# Patient Record
Sex: MALE | Race: WHITE | NOT HISPANIC OR LATINO | Employment: FULL TIME | URBAN - METROPOLITAN AREA
[De-identification: names, ages, dates, MRNs, and addresses within clinical notes are randomized per-mention and may not be internally consistent; named-entity substitution may affect disease eponyms.]

---

## 2017-02-09 RX ORDER — BUPROPION HYDROCHLORIDE 150 MG/1
150 TABLET, FILM COATED, EXTENDED RELEASE ORAL 2 TIMES DAILY
Qty: 180 TAB | Refills: 1 | Status: SHIPPED | OUTPATIENT
Start: 2017-02-09 | End: 2017-07-13 | Stop reason: SDUPTHER

## 2017-02-09 NOTE — TELEPHONE ENCOUNTER
Was the patient seen in the last year in this department? Yes     Does patient have an active prescription for medications requested? No     Received Request Via: Pharmacy       Last visit: 11/3/16    Last labs:11/8/16

## 2017-07-13 ENCOUNTER — OFFICE VISIT (OUTPATIENT)
Dept: MEDICAL GROUP | Facility: MEDICAL CENTER | Age: 27
End: 2017-07-13
Payer: COMMERCIAL

## 2017-07-13 VITALS
HEART RATE: 84 BPM | HEIGHT: 69 IN | TEMPERATURE: 100 F | DIASTOLIC BLOOD PRESSURE: 60 MMHG | BODY MASS INDEX: 23.25 KG/M2 | WEIGHT: 157 LBS | OXYGEN SATURATION: 95 % | SYSTOLIC BLOOD PRESSURE: 110 MMHG

## 2017-07-13 DIAGNOSIS — Z86.19 HISTORY OF CHICKEN POX: ICD-10-CM

## 2017-07-13 DIAGNOSIS — F32.A ANXIETY AND DEPRESSION: ICD-10-CM

## 2017-07-13 DIAGNOSIS — F41.9 ANXIETY AND DEPRESSION: ICD-10-CM

## 2017-07-13 PROCEDURE — 99214 OFFICE O/P EST MOD 30 MIN: CPT | Performed by: FAMILY MEDICINE

## 2017-07-13 RX ORDER — BUPROPION HYDROCHLORIDE 150 MG/1
150 TABLET, FILM COATED, EXTENDED RELEASE ORAL 2 TIMES DAILY
Qty: 180 TAB | Refills: 1 | Status: SHIPPED | OUTPATIENT
Start: 2017-07-13 | End: 2017-08-14 | Stop reason: SDUPTHER

## 2017-07-13 NOTE — PATIENT INSTRUCTIONS
Please find then bring in records of the following vaccinations to next appointment:     1) Hepatitis B vaccine (should be at least 2, if not 3 vaccines as a child)     2) Hepatitis A vaccine (should have been 2)     3) HPV (Human Papilloma Virus, should have been 3)

## 2017-07-13 NOTE — MR AVS SNAPSHOT
"        Zaid Jones   2017 3:40 PM   Office Visit   MRN: [id number]    Department:  [place] [address]   Dept Phone:  [phone]    Description:  Male : 1990   Provider:  Pankaj Camarillo M.D.           Reason for Visit     Medication Refill Zyban >>> smoking cessation      Allergies as of 2017     No Known Allergies      You were diagnosed with     Anxiety and depression   [003660]       History of chicken pox   [283325]         Vital Signs     Blood Pressure Pulse Temperature Height Weight Body Mass Index    110/60 mmHg 84 37.8 °C (100 °F) 1.753 m (5' 9.02\") 71.215 kg (157 lb) 23.17 kg/m2    Oxygen Saturation Smoking Status                95% Former Smoker          Basic Information     Date Of Birth Sex Race Ethnicity Preferred Language    1990 Male White Non- English      Your appointments     Aug 14, 2017  1:20 PM   Established Patient with Pankaj Camarillo M.D.   [facility]    [address]  [address]  [facility] [id number] [phone]           You will be receiving a confirmation call a few days before your appointment from our automated call confirmation system.              Problem List              ICD-10-CM Priority Class Noted - Resolved    Anxiety and depression F41.9, F32.9   11/3/2016 - Present    Chewing tobacco nicotine dependence F17.220   11/3/2016 - Present    S/P shoulder surgery Z98.890   12/3/2016 - Present    History of chicken pox Z86.19   2017 - Present      Health Maintenance        Date Due Completion Dates    IMM HEP B VACCINE (1 of 3 - Primary Series) 1990 ---    IMM HEP A VACCINE (1 of 2 - Standard Series) 10/8/1993 ---    IMM HPV VACCINE (1 of 3 - Male 3 Dose Series) 10/8/2003 ---    IMM VARICELLA (CHICKENPOX) VACCINE (1 of 2 - 2 Dose Adolescent Series) 10/8/2005 ---    IMM INFLUENZA (1) 2017 ---    IMM DTaP/Tdap/Td Vaccine (2 - Td) 2022            Current " Immunizations     Meningococcal Conjugate Vaccine MCV4 (Menactra) 12/14/2012    Tdap Vaccine 12/14/2012      Below and/or attached are the medications your provider expects you to take. Review all of your home medications and newly ordered medications with your provider and/or pharmacist. Follow medication instructions as directed by your provider and/or pharmacist. Please keep your medication list with you and share with your provider. Update the information when medications are discontinued, doses are changed, or new medications (including over-the-counter products) are added; and carry medication information at all times in the event of emergency situations     Allergies:  No Known Allergies          Medications  Valid as of: July 13, 2017 -  3:55 PM    Generic Name Brand Name Tablet Size Instructions for use    BuPROPion HCl (Smoking Deter) (TABLET SR 12 HR) ZYBAN 150 MG Take 1 Tab by mouth 2 times a day.        .                 Medicines prescribed today were sent to:     [facility] [address] [place] [address], [place] - [address]    [address] [place] [address]    Phone: [phone] Fax: [phone]    Open 24 Hours?: No      Medication refill instructions:       If your prescription bottle indicates you have medication refills left, it is not necessary to call your provider’s office. Please contact your pharmacy and they will refill your medication.    If your prescription bottle indicates you do not have any refills left, you may request refills at any time through one of the following ways: The online [employer] system (except Urgent Care), by calling your provider’s office, or by asking your pharmacy to contact your provider’s office with a refill request. Medication refills are processed only during regular business hours and may not be available until the next business day. Your provider may request additional information or to have a follow-up visit with you prior to refilling your medication.      *Please Note: Medication refills are assigned a new Rx number when refilled electronically. Your pharmacy may indicate that no refills were authorized even though a new prescription for the same medication is available at the pharmacy. Please request the medicine by name with the pharmacy before contacting your provider for a refill.        Instructions    Please find then bring in records of the following vaccinations to next appointment:     1) Hepatitis B vaccine (should be at least 2, if not 3 vaccines as a child)     2) Hepatitis A vaccine (should have been 2)     3) HPV (Human Papilloma Virus, should have been 3)          MyChart Access Code: Activation code not generated  Current MyChart Status: Active          Quit Tobacco Information     Do you want to quit using tobacco?    Quitting tobacco decreases risks of cancer, heart and lung disease, increases life expectancy, improves sense of taste and smell, and increases spending money, among other benefits.    If you are thinking about quitting, we can help.  • [facility] Quit Tobacco Program: [phone]  o Program occurs weekly for four weeks and includes pharmacist consultation on products to support quitting smoking or chewing tobacco. A provider referral is needed for pharmacist consultation.  • Tobacco Users Help Hotline: 0-599-QUIT-NOW (440-1891) or https://[place].quitlogix.org/  o Free, confidential telephone and online coaching for [place] residents. Sessions are designed on a schedule that is convenient for you. Eligible clients receive free nicotine replacement therapy.  • Nationally: www.smokefree.gov  o Information and professional assistance to support both immediate and long-term needs as you become, and remain, a non-smoker. Smokefree.gov allows you to choose the help that best fits your needs.

## 2017-07-19 NOTE — PROGRESS NOTES
Subjective:     Chief Complaint   Patient presents with   • Medication Refill     Zyban >>> smoking cessation       History of Present Illness:  Zaid Jones is a 26 y.o. male established patient who presents today to discuss Zyban refill for depression/anxiety management as well as to decrease dependence on chewing tobacco:    Anxiety and depression  Patient with continued stress/anxiety, states that he has continued using chew tobacco, and had been using Zyban until he ran out.  Previous dosage seemed to be working well.  Patient would like to stop chewing tobacco, realizes that he is using it to control stress/anxiety.  Patient states frequency of panic attacks was decreased on Zyban.    Patient continues to decline referral to psychiatrist and psychologist.    ROS is NEGATIVE for dizziness, generalized weakness/fatigue, vision/hearing changes, jaw pain/paresthesias, BUE pain/paresthesias/numbness/weakness, chest pain/pressure, palpitations, dyspnea, RUQ abdominal pain, oliguria/anuria, BLE edema.    History of chicken pox  Listed for historical purposes only.  Patient states he believes he had this at 9yo.  Patient declines Varicella vaccine at present.      Patient Active Problem List    Diagnosis Date Noted   • History of chicken pox 07/13/2017   • S/P shoulder surgery 12/03/2016   • Anxiety and depression 11/03/2016   • Chewing tobacco nicotine dependence 11/03/2016       Additional History:   Allergies:    Review of patient's allergies indicates no known allergies.     Current Medications:     Current Outpatient Prescriptions   Medication Sig Dispense Refill   • buPROPion SR (ZYBAN) 150 MG SR tablet Take 1 Tab by mouth 2 times a day. 180 Tab 1     No current facility-administered medications for this visit.        Social History:     Social History   Substance Use Topics   • Smoking status: Former Smoker     Types: Cigarettes     Quit date: 07/01/2015   • Smokeless tobacco: Current User     Types:  "Chew      Comment: 1chewing tin per day   • Alcohol Use: 0.6 oz/week     1 Cans of beer per week       ROS:     - NOTE: All other systems reviewed and are negative, except as in HPI.     Objective:   Physical Exam:    Vitals: Blood pressure 110/60, pulse 84, temperature 37.8 °C (100 °F), height 1.753 m (5' 9.02\"), weight 71.215 kg (157 lb), SpO2 95 %.   BMI: Body mass index is 23.17 kg/(m^2).   General/Constitutional: Vitals as above, Well nourished, well developed male in no acute distress   Head/Eyes: Head is grossly normal & atraumatic, bilateral conjunctivae clear and not injected, bilateral EOMI, bilateral PERRL   ENT: Bilateral external ears grossly normal in appearance, Hearing grossly intact, External nares normal in appearance and without discharge/bleeding   Respiratory: No respiratory distress, bilateral lungs are clear to ausculation in all lung fields (anterior/lateral/posterior), no wheezing/rhonchi/rales   Cardiovascular: Regular rate and rhythm without murmur/gallops/rubs, distal pulses are intact and equal bilaterally (radial, posterior tibial), no bilateral lower extremity edema   MSK: Gait grossly normal & not antalgic   Integumentary: No apparent rashes   Psych: Judgment grossly appropriate, mild apparent anxiety    Health Maintenance:      - declines varicella vaccine    Assessment and Plan:   1. Anxiety and depression  Uncontrolled.  Resume Zyban, to be started as 150mg PO Qday for 1 week, then to increase to BID.   - buPROPion SR (ZYBAN) 150 MG SR tablet; Take 1 Tab by mouth 2 times a day.  Dispense: 180 Tab; Refill: 1    2. History of chicken pox  Listed for histroical purposes, currently asymptomatic.      RTC: in 1 month to review anxiety/depression.    PLEASE NOTE: This dictation was created using voice recognition software. I have made every reasonable attempt to correct obvious errors, but I expect that there are errors of grammar and possibly content that I did not discover before " finalizing the note.

## 2017-07-19 NOTE — ASSESSMENT & PLAN NOTE
Listed for historical purposes only.  Patient states he believes he had this at 7yo.  Patient declines Varicella vaccine at present.

## 2017-07-19 NOTE — ASSESSMENT & PLAN NOTE
Patient with continued stress/anxiety, states that he has continued using chew tobacco, and had been using Zyban until he ran out.  Previous dosage seemed to be working well.  Patient would like to stop chewing tobacco, realizes that he is using it to control stress/anxiety.  Patient states frequency of panic attacks was decreased on Zyban.    Patient continues to decline referral to psychiatrist and psychologist.    ROS is NEGATIVE for dizziness, generalized weakness/fatigue, vision/hearing changes, jaw pain/paresthesias, BUE pain/paresthesias/numbness/weakness, chest pain/pressure, palpitations, dyspnea, RUQ abdominal pain, oliguria/anuria, BLE edema.

## 2017-08-14 ENCOUNTER — OFFICE VISIT (OUTPATIENT)
Dept: MEDICAL GROUP | Facility: MEDICAL CENTER | Age: 27
End: 2017-08-14
Payer: COMMERCIAL

## 2017-08-14 VITALS
BODY MASS INDEX: 23.22 KG/M2 | WEIGHT: 156.75 LBS | HEART RATE: 72 BPM | HEIGHT: 69 IN | RESPIRATION RATE: 16 BRPM | OXYGEN SATURATION: 98 % | SYSTOLIC BLOOD PRESSURE: 110 MMHG | TEMPERATURE: 98.2 F | DIASTOLIC BLOOD PRESSURE: 64 MMHG

## 2017-08-14 DIAGNOSIS — F41.9 ANXIETY AND DEPRESSION: ICD-10-CM

## 2017-08-14 DIAGNOSIS — F17.220 CHEWING TOBACCO NICOTINE DEPENDENCE WITHOUT COMPLICATION: ICD-10-CM

## 2017-08-14 DIAGNOSIS — B07.8 VERRUCA PALMARIS: ICD-10-CM

## 2017-08-14 DIAGNOSIS — F32.A ANXIETY AND DEPRESSION: ICD-10-CM

## 2017-08-14 PROCEDURE — 17110 DESTRUCTION B9 LES UP TO 14: CPT | Performed by: FAMILY MEDICINE

## 2017-08-14 PROCEDURE — 99214 OFFICE O/P EST MOD 30 MIN: CPT | Mod: 25 | Performed by: FAMILY MEDICINE

## 2017-08-14 PROCEDURE — 99406 BEHAV CHNG SMOKING 3-10 MIN: CPT | Performed by: FAMILY MEDICINE

## 2017-08-14 RX ORDER — BUPROPION HYDROCHLORIDE 150 MG/1
150 TABLET, FILM COATED, EXTENDED RELEASE ORAL 2 TIMES DAILY
Qty: 180 TAB | Refills: 1 | Status: SHIPPED | OUTPATIENT
Start: 2017-08-14

## 2017-08-14 NOTE — PROGRESS NOTES
Subjective:     Chief Complaint   Patient presents with   • Medication Refill       History of Present Illness:  aZid Jones is a 26 y.o. male established patient who presents today to discuss measuring anxiety/depression as well as chewing tobacco cessation, and also to have hand warts addressed.:    Anxiety and depression  Uncontrolled, however unchanged from visit in July 2017. Patient admits that he did not  his Zyban. Patient therefore has been relying on his chew tobacco for relief of anxiety. However, patient has made headway with this, chewing one to one half cans every 2 days rather than one can per day as previous.    Patient continues to decline referral to psychiatrist or psychologist.    ROS is NEGATIVE for generalized weakness/fatigue, dizziness, vision/hearing changes, chest pain/pressure, palpitations, dyspnea, tachypnea, intense feelings of dread, insomnia, decreased appetite, persistent nausea.    Chewing tobacco nicotine dependence  Please see notes from same date of service 8/14/2017 regarding anxiety and depression.    Verruca palmaris  Patient has multiple palmar warts and warts on fingers that he is concerned about. Patient would like to have these evaluated and treated. Patient is not clear on when these lesions started, states that they have been occuring since at least late high school, maybe longer, and come and go (somewhat) with home cryotherapy treatment. However, these home cryotherapy treatments do not fully resolve the lesions.      Patient Active Problem List    Diagnosis Date Noted   • Verruca palmaris 08/14/2017   • History of chicken pox 07/13/2017   • S/P shoulder surgery 12/03/2016   • Anxiety and depression 11/03/2016   • Chewing tobacco nicotine dependence 11/03/2016       Additional History:   Allergies:    Review of patient's allergies indicates no known allergies.     Current Medications:     Current Outpatient Prescriptions   Medication Sig Dispense Refill  "  • buPROPion SR (ZYBAN) 150 MG SR tablet Take 1 Tab by mouth 2 times a day. Start as one tablet by mouth daily for one week, then increase to twice daily if needed. 180 Tab 1     No current facility-administered medications for this visit.        Social History:     Social History   Substance Use Topics   • Smoking status: Former Smoker     Types: Cigarettes     Quit date: 07/01/2015   • Smokeless tobacco: Current User     Types: Chew      Comment: (08/14/17) 1-1.5chewing tin per 2days   • Alcohol Use: 0.6 oz/week     1 Cans of beer per week       ROS:     - NOTE: All other systems reviewed and are negative, except as in HPI.     Objective:   Physical Exam:    Vitals: Blood pressure 110/64, pulse 72, temperature 36.8 °C (98.2 °F), resp. rate 16, height 1.753 m (5' 9.02\"), weight 71.1 kg (156 lb 12 oz), SpO2 98 %.   BMI: Body mass index is 23.14 kg/(m^2).   General/Constitutional: Vitals as above, Well nourished, well developed male in no acute distress   Head/Eyes: Head is grossly normal & atraumatic, bilateral conjunctivae clear and not injected, bilateral EOMI, bilateral PERRL   ENT: Bilateral external ears grossly normal in appearance, Hearing grossly intact, External nares normal in appearance and without discharge/bleeding   Respiratory: No respiratory distress, bilateral lungs are clear to ausculation in all lung fields (anterior/lateral/posterior), no wheezing/rhonchi/rales   Cardiovascular: Regular rate and rhythm without murmur/gallops/rubs, distal pulses are intact and equal bilaterally (radial, posterior tibial), no bilateral lower extremity edema   MSK: Gait grossly normal & not antalgic   Integumentary: Right hand has one lesion each on the third and fourth digits on dorsal side (third digit has a small 2-3 mm diameter by 1-2 mm in height warty lesion that is overlying his proximal interphalangeal joint, fourth digit has a similar sized lesion on the middle phalanx), Left hand has 4 lesions in total (2 " lesions on the fourth digit intermediate phalanx that are approximately 1-2 mm in diameter nearly flat, lesion on the second digit distal phalanx lateral side on palmar aspect is approximately 5-6 mm in diameter with 2-3 mm in height with melanotic flecks, and lesion on palmar surface of hand approximately over the lunate bone is approximately 8-9 mm in diameter that is approximately 1 mm in height with melanotic flecks), No apparent rashes   Psych: Judgment grossly appropriate, no apparent depression/anxiety    Procedure: Cutaneous cryotherapy    **NOTE: Sterile technique was maintained throughout procedure       1) Risks, benefits and alternatives of procedure were discussed with patient including, but not limited to: poor cosmetic outcome (blistering, hypopigmentation, scarring [rare]) and bleeding, low risk of infection and minimal wound care, and watchful waiting, respectively.     2) Patient verbalizes consent to proceed with procedure.     3) Location identified with patient prior to procedure start as above.     4) Areas cleaned with alcohol swabs.      5) Multiple 2-3 second bursts of cryotherapy were applied to lesion, waiting 1-2 seconds between bursts until blanching occurred. This process was performed 4 times in total for left hand fourth digit lesions, 5 times in total for right hand dorsal lesions, and 6 times in total for the remaining lesions on left hand (second digit left hand, lesion overlying the lunate bone on palmar surface) times in total.     6) Bandaids applied to cover lesions on left hand as she is a  and uses a baseball glove/mitt on his hand.     7) Patient tolerated procedure well.  Patient verbalized understanding that pain, as well as blistering or scabbing reaction would likely occur. Patient reminded not pick at the area and to expect possible hypopigmented scars from the procedure. Return if lesion fails to fully resolve.    Health Maintenance:      - not addressed  at this visit    Assessment and Plan:   1. Anxiety and depression  Uncontrolled. Trial of Zyban as below. Reassess symptoms in approximately 4 weeks.   - buPROPion SR (ZYBAN) 150 MG SR tablet; Take 1 Tab by mouth 2 times a day. Start as one tablet by mouth daily for one week, then increase to twice daily if needed.  Dispense: 180 Tab; Refill: 1    2. Chewing tobacco nicotine dependence without complication  Uncontrolled.   A) Discussion: Patient and I discussed various methods for chewing tobacco cessation including pharmacological supports (Chantix vs. Zyban, Nicotine replacement therapy), smoking cessation groups, smoking hotlines, as well as abrupt cessation over gradual cessation (in addition to setting quit dates; ref: [employer]. doi:10.9826/E13-6396).  We discussed that number of lifetime quit attempts vary (http://dx.doi.org/10.1136/yslnrzl-7490-761214) but that probability of success increases when combining methods (pharmacologic + behavioral modification).      B) Stage of change: Planning/Action   C) Total time of discussion: 3minutes   D) Next step: Start Zyban    3. Verruca palmaris  Uncontrolled, cryotherapy as above.      RTC: In 4 weeks to reassess anxiety/depression + chewing tobacco cessation, as well as to reevaluate and lesions.    PLEASE NOTE: This dictation was created using voice recognition software. I have made every reasonable attempt to correct obvious errors, but I expect that there are errors of grammar and possibly content that I did not discover before finalizing the note.

## 2017-08-14 NOTE — ASSESSMENT & PLAN NOTE
Patient has multiple palmar warts and warts on fingers that he is concerned about. Patient would like to have these evaluated and treated. Patient is not clear on when these lesions started, states that they have been occuring since at least late high school, maybe longer, and come and go (somewhat) with home cryotherapy treatment. However, these home cryotherapy treatments do not fully resolve the lesions.

## 2017-08-14 NOTE — MR AVS SNAPSHOT
"        Zaid Jones   2017 1:20 PM   Office Visit   MRN: [id number]    Department:  [place] [address]   Dept Phone:  [phone]    Description:  Male : 1990   Provider:  aPnkaj Camarillo M.D.           Reason for Visit     Medication Refill           Allergies as of 2017     No Known Allergies      You were diagnosed with     Anxiety and depression   [156258]       Chewing tobacco nicotine dependence without complication   [768078]         Vital Signs     Blood Pressure Pulse Temperature Respirations Height Weight    110/64 mmHg 72 36.8 °C (98.2 °F) 16 1.753 m (5' 9.02\") 71.1 kg (156 lb 12 oz)    Body Mass Index Oxygen Saturation Smoking Status             23.14 kg/m2 98% Former Smoker         Basic Information     Date Of Birth Sex Race Ethnicity Preferred Language    1990 Male White Non- English      Your appointments     Sep 14, 2017  4:20 PM   Established Patient with Pankaj Camarillo M.D.   [facility]    [address]  [address]  [facility] [address] [phone]           You will be receiving a confirmation call a few days before your appointment from our automated call confirmation system.              Problem List              ICD-10-CM Priority Class Noted - Resolved    Anxiety and depression F41.9, F32.9   11/3/2016 - Present    Chewing tobacco nicotine dependence F17.220   11/3/2016 - Present    S/P shoulder surgery Z98.890   12/3/2016 - Present    History of chicken pox Z86.19   2017 - Present      Health Maintenance        Date Due Completion Dates    IMM HEP B VACCINE (1 of 3 - Primary Series) 1990 ---    IMM HEP A VACCINE (1 of 2 - Standard Series) 10/8/1993 ---    IMM HPV VACCINE (1 of 3 - Male 3 Dose Series) 10/8/2003 ---    IMM VARICELLA (CHICKENPOX) VACCINE (2 of 2 - 2 Dose Adolescent Series) 8/10/2017 2017, 2017    IMM INFLUENZA (1) 2017 ---    IMM DTaP/Tdap/Td Vaccine (2 - Td) " 12/14/2022 12/14/2012            Current Immunizations     Meningococcal Conjugate Vaccine MCV4 (Menactra) 12/14/2012    Tdap Vaccine 12/14/2012      Below and/or attached are the medications your provider expects you to take. Review all of your home medications and newly ordered medications with your provider and/or pharmacist. Follow medication instructions as directed by your provider and/or pharmacist. Please keep your medication list with you and share with your provider. Update the information when medications are discontinued, doses are changed, or new medications (including over-the-counter products) are added; and carry medication information at all times in the event of emergency situations     Allergies:  No Known Allergies          Medications  Valid as of: August 14, 2017 -  1:51 PM    Generic Name Brand Name Tablet Size Instructions for use    BuPROPion HCl (Smoking Deter) (TABLET SR 12 HR) ZYBAN 150 MG Take 1 Tab by mouth 2 times a day. Start as one tablet by mouth daily for one week, then increase to twice daily if needed.        .                 Medicines prescribed today were sent to:     [facility] [address] [place] [address], [place] - [address]    [address] [facility] [address]    Phone: [phone] Fax: [phone]    Open 24 Hours?: No      Medication refill instructions:       If your prescription bottle indicates you have medication refills left, it is not necessary to call your provider’s office. Please contact your pharmacy and they will refill your medication.    If your prescription bottle indicates you do not have any refills left, you may request refills at any time through one of the following ways: The online [employer] system (except Urgent Care), by calling your provider’s office, or by asking your pharmacy to contact your provider’s office with a refill request. Medication refills are processed only during regular business hours and may not be available until the next  business day. Your provider may request additional information or to have a follow-up visit with you prior to refilling your medication.   *Please Note: Medication refills are assigned a new Rx number when refilled electronically. Your pharmacy may indicate that no refills were authorized even though a new prescription for the same medication is available at the pharmacy. Please request the medicine by name with the pharmacy before contacting your provider for a refill.           MyChart Access Code: Activation code not generated  Current MyChart Status: Active          Quit Tobacco Information     Do you want to quit using tobacco?    Quitting tobacco decreases risks of cancer, heart and lung disease, increases life expectancy, improves sense of taste and smell, and increases spending money, among other benefits.    If you are thinking about quitting, we can help.  • [employer] Quit Tobacco Program: 246.477.4176  o Program occurs weekly for four weeks and includes pharmacist consultation on products to support quitting smoking or chewing tobacco. A provider referral is needed for pharmacist consultation.  • Tobacco Users Help Hotline: 4-452-QUIT-NOW (134-6971) or https://[place].quitlogix.org/  o Free, confidential telephone and online coaching for [place] residents. Sessions are designed on a schedule that is convenient for you. Eligible clients receive free nicotine replacement therapy.  • Nationally: www.smokefree.gov  o Information and professional assistance to support both immediate and long-term needs as you become, and remain, a non-smoker. Smokefree.gov allows you to choose the help that best fits your needs.

## 2017-08-31 ENCOUNTER — OFFICE VISIT (OUTPATIENT)
Dept: MEDICAL GROUP | Facility: MEDICAL CENTER | Age: 27
End: 2017-08-31
Payer: COMMERCIAL

## 2017-08-31 VITALS
BODY MASS INDEX: 23.71 KG/M2 | OXYGEN SATURATION: 96 % | HEIGHT: 69 IN | TEMPERATURE: 98.3 F | DIASTOLIC BLOOD PRESSURE: 76 MMHG | WEIGHT: 160.05 LBS | RESPIRATION RATE: 12 BRPM | HEART RATE: 68 BPM | SYSTOLIC BLOOD PRESSURE: 122 MMHG

## 2017-08-31 DIAGNOSIS — G44.019 EPISODIC CLUSTER HEADACHE, NOT INTRACTABLE: Primary | ICD-10-CM

## 2017-08-31 DIAGNOSIS — B07.8 VERRUCA PALMARIS: ICD-10-CM

## 2017-08-31 PROCEDURE — 17110 DESTRUCTION B9 LES UP TO 14: CPT | Performed by: FAMILY MEDICINE

## 2017-08-31 PROCEDURE — 99214 OFFICE O/P EST MOD 30 MIN: CPT | Mod: 25 | Performed by: FAMILY MEDICINE

## 2017-08-31 RX ORDER — ZOLMITRIPTAN 5 MG/1
5 TABLET, FILM COATED ORAL PRN
Qty: 10 TAB | Refills: 3 | Status: SHIPPED | OUTPATIENT
Start: 2017-08-31

## 2017-09-09 NOTE — ASSESSMENT & PLAN NOTE
Patient concerned for headaches that have been occurring more frequently lately, up to 2-3 times per week.  Headaches are described as pounding headaches, typically unilateral (left side) but can be bilateral.      No PMH or FMH of epileptiform seizures    ROS is NEGATIVE for confusion, altered mentation, word finding difficulty, memory loss, facial droop, dysarthria, dysphagia, hemiplegia, gait instability.

## 2017-09-10 NOTE — PROGRESS NOTES
Subjective:     Chief Complaint   Patient presents with   • Follow-Up     pt has questions on headaches he has been getting        History of Present Illness:  Zaid Jones is a 26 y.o. male established patient who presents today to have evaluation of headaches, as well as to have evaluation of hand wart lesions:    Episodic cluster headache, not intractable  Patient concerned for headaches that have been occurring more frequently lately, up to 2-3 times per week.  Headaches are described as pounding headaches, typically unilateral (left side) but can be bilateral.      No PMH or FMH of epileptiform seizures    ROS is NEGATIVE for confusion, altered mentation, word finding difficulty, memory loss, facial droop, dysarthria, dysphagia, hemiplegia, gait instability.    Verruca palmaris  Patient states that all lesions are improved, some more so than others.      Patient Active Problem List    Diagnosis Date Noted   • Episodic cluster headache, not intractable 08/31/2017   • Verruca palmaris 08/14/2017   • History of chicken pox 07/13/2017   • S/P shoulder surgery 12/03/2016   • Anxiety and depression 11/03/2016   • Chewing tobacco nicotine dependence 11/03/2016       Additional History:   Allergies:    Review of patient's allergies indicates no known allergies.     Current Medications:     Current Outpatient Prescriptions   Medication Sig Dispense Refill   • zolmitriptan (ZOMIG) 5 MG tablet Take 1 Tab by mouth as needed for Migraine (or Cluster Headaches). 10 Tab 3   • buPROPion SR (ZYBAN) 150 MG SR tablet Take 1 Tab by mouth 2 times a day. Start as one tablet by mouth daily for one week, then increase to twice daily if needed. 180 Tab 1     No current facility-administered medications for this visit.         Social History:     Social History   Substance Use Topics   • Smoking status: Former Smoker     Types: Cigarettes     Quit date: 7/1/2015   • Smokeless tobacco: Current User     Types: Chew      Comment:  "(08/14/17) 1-1.5chewing tin per 2days   • Alcohol use 0.6 oz/week     1 Cans of beer per week       ROS:     - NOTE: All other systems reviewed and are negative, except as in HPI.     Objective:   Physical Exam:    Vitals: Blood pressure 122/76, pulse 68, temperature 36.8 °C (98.3 °F), resp. rate 12, height 1.753 m (5' 9\"), weight 72.6 kg (160 lb 0.9 oz), SpO2 96 %.   BMI: Body mass index is 23.64 kg/m².   General/Constitutional: Vitals as above, Well nourished, well developed male in no acute distress   Head/Eyes: Head is grossly normal & atraumatic, bilateral conjunctivae clear and not injected, bilateral EOMI, bilateral PERRL   ENT: Bilateral external ears grossly normal in appearance, Hearing grossly intact, External nares normal in appearance and without discharge/bleeding   Respiratory: No respiratory distress, bilateral lungs are clear to ausculation in all lung fields (anterior/lateral/posterior), no wheezing/rhonchi/rales   Cardiovascular: Regular rate and rhythm without murmur/gallops/rubs, distal pulses are intact and equal bilaterally (radial, posterior tibial), no bilateral lower extremity edema   MSK: Gait grossly normal & not antalgic   Integumentary: Right hand has one lesion each on the third and fourth digits on dorsal side (third digit has a small 2-3 mm diameter by 1 mm in height warty lesion that is overlying his proximal interphalangeal joint, fourth digit has a similar sized lesion [3mm diameter] on the middle phalanx), Left hand has 4 lesions in total (2 lesions on the fourth digit intermediate phalanx that are approximately 1-1.5 mm in diameter flat, lesion on the second digit distal phalanx lateral side on palmar aspect is approximately 2-3 mm in diameter with 1mm in height with melanotic flecks, and lesion on palmar surface of hand approximately over the lunate bone is approximately 6mm in diameter that is approximately 1 mm in height with melanotic flecks), otherwise No apparent " rashes   Psych: Judgment grossly appropriate, no apparent depression/anxiety    Procedure: Cutaneous cryotherapy    **NOTE: Sterile technique was maintained throughout procedure        1) Risks, benefits and alternatives of procedure were discussed with patient including, but not limited to: poor cosmetic outcome (blistering, hypopigmentation, scarring [rare]) and bleeding, low risk of infection and minimal wound care, and watchful waiting, respectively.     2) Patient verbalizes consent to proceed with procedure.     3) Location identified with patient prior to procedure start as above.     4) Areas cleaned with alcohol swabs.      5) Multiple 2-3 second bursts of cryotherapy were applied to lesion, waiting 1-2 seconds between bursts until blanching occurred. This process was performed 3 times in total for left hand second digit lesions, 4times in total for left hand fourth digit lesions, 4 times in total for right hand dorsal lesions, and 6 times in total for the remaining lesions on left hand (lesion overlying the lunate bone on palmar surface).     6) Bandaids applied to cover lesions.     7) Patient tolerated procedure well.  Patient verbalized understanding that pain, as well as blistering or scabbing reaction would likely occur. Patient reminded not pick at the area and to expect possible hypopigmented scars from the procedure. Return if lesion fails to fully resolve.    Health Maintenance:      - Not addressed at this visit      Assessment and Plan:   1. Episodic cluster headache, not intractable  Uncontrolled.  Trial of triptan as below.   - zolmitriptan (ZOMIG) 5 MG tablet; Take 1 Tab by mouth as needed for Migraine (or Cluster Headaches).  Dispense: 10 Tab; Refill: 3    2. Verruca palmaris  Uncontrolled but improving, cryotherapy as above      RTC: in 1month for evaluation of verruca palmaris.    PLEASE NOTE: This dictation was created using voice recognition software. I have made every reasonable attempt  to correct obvious errors, but I expect that there are errors of grammar and possibly content that I did not discover before finalizing the note.